# Patient Record
Sex: MALE | Race: WHITE | NOT HISPANIC OR LATINO | Employment: FULL TIME | URBAN - METROPOLITAN AREA
[De-identification: names, ages, dates, MRNs, and addresses within clinical notes are randomized per-mention and may not be internally consistent; named-entity substitution may affect disease eponyms.]

---

## 2021-04-20 ENCOUNTER — OFFICE VISIT - HEALTHEAST (OUTPATIENT)
Dept: FAMILY MEDICINE | Facility: CLINIC | Age: 41
End: 2021-04-20

## 2021-04-20 DIAGNOSIS — G89.29 CHRONIC PAIN OF RIGHT KNEE: ICD-10-CM

## 2021-04-20 DIAGNOSIS — M23.203 OLD TEAR OF MEDIAL MENISCUS OF RIGHT KNEE, UNSPECIFIED TEAR TYPE: ICD-10-CM

## 2021-04-20 DIAGNOSIS — M77.11 LATERAL EPICONDYLITIS OF RIGHT ELBOW: ICD-10-CM

## 2021-04-20 DIAGNOSIS — M25.561 CHRONIC PAIN OF RIGHT KNEE: ICD-10-CM

## 2021-04-20 RX ORDER — FAMOTIDINE 20 MG/1
20 TABLET, FILM COATED ORAL
Status: SHIPPED | COMMUNITY
Start: 2020-08-05

## 2021-05-03 ENCOUNTER — OFFICE VISIT - HEALTHEAST (OUTPATIENT)
Dept: PHYSICAL THERAPY | Facility: REHABILITATION | Age: 41
End: 2021-05-03

## 2021-05-03 DIAGNOSIS — M25.561 CHRONIC PAIN OF RIGHT KNEE: ICD-10-CM

## 2021-05-03 DIAGNOSIS — M23.203 OLD TEAR OF MEDIAL MENISCUS OF RIGHT KNEE, UNSPECIFIED TEAR TYPE: ICD-10-CM

## 2021-05-03 DIAGNOSIS — G89.29 CHRONIC PAIN OF RIGHT KNEE: ICD-10-CM

## 2021-05-03 DIAGNOSIS — M77.11 LATERAL EPICONDYLITIS OF RIGHT ELBOW: ICD-10-CM

## 2021-05-17 ENCOUNTER — OFFICE VISIT - HEALTHEAST (OUTPATIENT)
Dept: PHYSICAL THERAPY | Facility: REHABILITATION | Age: 41
End: 2021-05-17

## 2021-05-17 DIAGNOSIS — M23.203 OLD TEAR OF MEDIAL MENISCUS OF RIGHT KNEE, UNSPECIFIED TEAR TYPE: ICD-10-CM

## 2021-05-17 DIAGNOSIS — M25.561 CHRONIC PAIN OF RIGHT KNEE: ICD-10-CM

## 2021-05-17 DIAGNOSIS — G89.29 CHRONIC PAIN OF RIGHT KNEE: ICD-10-CM

## 2021-05-17 DIAGNOSIS — M77.11 LATERAL EPICONDYLITIS OF RIGHT ELBOW: ICD-10-CM

## 2021-06-05 VITALS
DIASTOLIC BLOOD PRESSURE: 90 MMHG | OXYGEN SATURATION: 99 % | SYSTOLIC BLOOD PRESSURE: 120 MMHG | WEIGHT: 183.8 LBS | HEART RATE: 70 BPM

## 2021-06-16 NOTE — PROGRESS NOTES
Assessment and Plan   41-year-old male with past medical history of meniscal injury approximately 10 years ago who presents with concerns of acute on chronic right knee pain as well as new right elbow pain.  History and exam most consistent with medial meniscal injury of the right knee and lateral epicondylitis.  Recommended conservative treatment at this time with physical therapy for both, course of scheduled NSAIDs for the next week for lateral epicondylitis, given exercises to do for both in the meantime until physical therapy starts, icing daily, wearing brace for tennis elbow.  Did discuss trying corticosteroid injections in his knee.  Shared decision making decided to wait on this for now but in the future if no improvement in symptoms would consider this.  Would also consider referral to orthopedics as well as further imaging if no improvement in 6 weeks with these therapies.  Recommended he schedule follow-up with me at that time and will do physical exam as well as he wishes to establish care with myself and have a physical.    1. Chronic pain of right knee  - Ambulatory referral to Adult PT- Internal    2. Old tear of medial meniscus of right knee, unspecified tear type  - Ambulatory referral to Adult PT- Internal    3. Lateral epicondylitis of right elbow  - Ambulatory referral to Adult PT- Internal    Follow up: 4-6 weeks for physical  Options for treatment and follow-up care were reviewed with the patient and/or guardian. Ted LUIS Jeffry and/or guardian engaged in the decision making process and verbalized understanding of the options discussed and agreed with the final plan.    Dr. Brian Gonzalez MD         HPI:   Ted M Teran is a 41 y.o.  male with problems as below, who presents for:    Chief Complaint   Patient presents with     Knee Pain     knee pain. 2 months ago pain flared up from injury 10 years ago. tear in miniscus from mri      Elbow Injury     right elbow, slipped on driveway a  month ago. wrist hurt and now elbow, pain keeps him up at night, does massage and chiropractor for elbow      Right knee pain:  Patient has had pain in the right knee for last 10 years. He had an MRI in [place] which apparently showed a meniscal tear. Very small per patient.  No injury then.  He describes th pain as the inside of his knee and is aggravated by kneeling or crouching.  Mild swelling at end of th day of th inside. Never has done physical therapy. Has not tried any medications.     Elbow:  4-5 weeks ago slipped on ice and hit elbow on ground.  Having waxing and waning pain since.  He went a chirpractor which helped some. He has been wearing a brace for tennis elbow which helps at work but then when he gets home he has persistent pain.  Descries the pain as on the lateral aspect of his elbow.  He thinks extending his elbow agravates it. Also aggravated by picking things up.            PMHX:   There is no problem list on file for this patient.      Current Outpatient Medications   Medication Sig Dispense Refill     famotidine (PEPCID) 20 MG tablet Take 20 mg by mouth.       No current facility-administered medications for this visit.        Social History     Tobacco Use     Smoking status: Not on file   Substance Use Topics     Alcohol use: Not on file     Drug use: Not on file       Social History     Social History Narrative     Not on file       No Known Allergies           Review of Systems:    Complete ROS is negative except as noted in the HPI         Physical Exam:   /90 (Patient Site: Right Arm, Patient Position: Sitting, Cuff Size: Adult Large)   Pulse 70   Wt 183 lb 12.8 oz (83.4 kg)   SpO2 99%     General appearance: Alert, cooperative, no distress, appears stated age  Head: Normocephalic, atraumatic, without obvious abnormality  Eyes: Pupils equal round, reactive.  Conjunctiva clear.  Neck: Supple, symmetric, trachea midline, no adenopathy. .  Lungs: Clear to auscultation  bilaterally, no wheezing or crackles present.  Respirations unlabored  Heart: Regular rate and rhythm, normal S1 and S2, no murmur, rub or gallop.    Right Knee:  ROM: 0-130; Crepitus: no  Effusion: no ; Swelling: no  Strength: Full in flexion/ extension  Tenderness: Patella - no Medial joint line - no; Lateral joint line - no; Quad tendon - no; Patellar tendon- no; Hamstring - no.  Cruciates: anterior drawer - neg/posterior drawer -neg. Lachman - neg  Collaterals: varus -neg/valgus -mildly painful  Patella: patellar compression - neg  Meniscus: Nasreen - neg    Right ELBOW:   Swelling: no   ROM: Flexion - Full/ extension - Full/ pronation - Full / supination- Full.   Strength: 5/5 w/ elbow flexion/ extension   Bony tenderness: No tenderness at medial epicondyle.  MIld pain at lateral epicondyle with palpation  Neuro: Negative ulnar tinel test.   Maneuvers: No pain w/ resisted wrist flexion/ pronation ; pain w/ resisted wrist extension.  None with supination/ long finger extension;

## 2021-06-16 NOTE — PATIENT INSTRUCTIONS - HE
Do excercises 20 min every day  GO physical therapy  Use ibuprofen 2 tablets twice daily for the next week  Icing on knee and elbow  Wear elbow brace

## 2021-06-17 NOTE — PROGRESS NOTES
[facility]   Knee Initial Evaluation    Patient Name: Ted Teran  Date of evaluation: 5/3/47742  Referral Diagnosis: Chronic pain of right knee  Referring provider: Brian Nicholson, *  Visit Diagnosis:     ICD-10-CM    1. Chronic pain of right knee  M25.561     G89.29    2. Old tear of medial meniscus of right knee, unspecified tear type  M23.203    3. Lateral epicondylitis of right elbow  M77.11         Precautions / Restrictions : none       Assessment:      Impairments in  pain, posture, ROM, joint mobility, strength, gait/locomotion and balance  Patient's signs and symptoms are consistent with right lateral epicondylitis, and possible remote medial horn anterolateral meniscal tear.  Patient responded well to manual therapy and HEP.  Prognosis to achieve goals is  good   Pt. is appropriate for skilled PT intervention as outlined in the Plan of Care (POC).    Goals:  Pt. will demonstrate/verbalize independence in self-management of condition in : 6 weeks  Pt. will be independent with home exercise program in : 6 weeks  Pt. will report decreased intensity, frequency of : Pain;in 6 weeks  Pt. will have improved quality of sleep: with less pain;waking less times/night;in 6 weeks  Patient will work: with improved ability to work without restrictions;in 6 weeks  Patient will reach / maintain arm movement: overhead;forward;behind;for work;for home chores;for dressing;with no pain;with less difficulty;in 6 weeks    No data recorded    Patient's expectations/goals are realistic.    Barriers to Learning or Achieving Goals:  No Barriers.       Plan / Patient Instructions:      Plan of Care:   Communication with: Referral Source  Patient Related Instruction: Nature of Condition;Body mechanics;Posture;Treatment plan and rationale;Next steps;Precautions;Self Care instruction;Expected outcome;Basis of treatment  Times per Week: 1  Number of Weeks: 6-8  Number of Visits: 8  Discharge Planning:  to include HEP and self management.  Precautions / Restrictions : none  Therapeutic Exercise: ROM;Stretching;Strengthening  Neuromuscular Reeducation: posture;core  Manual Therapy: myofascial release;joint mobilization;soft tissue mobilization;muscle energy      Plan for next visit: review HEP, continue manual therapy, trial eccentric strengthening.     Subjective:          History of Present Illness:    Ted is a 41 y.o. male who presents to therapy today with complaints of right elbow pain that is constant and increases with activity.  He also complains of intermittent right knee pain, which he thinks comes from an old meniscal tear injury that was diagnosed by his ortho MD with MRI. Date of onset/duration of symptoms is March 2021. Onset was sudden and related to trauma. Symptoms are constant, intermittent and not improving. He denies history of similar symptoms. He describes their previous level of function as not limited    Pain Rating:3  Pain rating at best: 3  Pain rating at worst: 10  Pain description:aching, dull, pain, sharp and soreness    Functional limitations are described as occurring with:   gripping and holding  lifting  reaching overhead  sleeping         Objective:      Note: Items left blank indicates the item was not performed or not indicated at the time of the evaluation.    Patient Outcome Measures :    No data recorded   Scores range from 0-80, where a score of 80 represents maximum function. The minimal clinically important difference is a positive change of 9 points.    Knee Examination  1. Chronic pain of right knee     2. Old tear of medial meniscus of right knee, unspecified tear type     3. Lateral epicondylitis of right elbow       Precautions / Restrictions : none     Involved Side: Right  Posture Observation:      General sitting posture is  fair.  General standing posture is fair.  Cervical:  Moderate forward head  Shoulder/Thoracic complex: Moderate bilateral scapular  protraction   Assistive Device: None  Gait Observation: WNL  Lumbar Clearing: Does not provoke symptoms  Hip Clearing: Does not provoke symptoms    Knee ROM       Date:  05/03/21     AROM in degrees  Right   Left  Right   Left  Right   Left       Knee Flexion  (130 )   130                      Knee Extension  (0 )   0                  130  PROM in degrees  Right   Left  Right   Left  Right   Left       Knee Flexion  (130 )                         Knee Extension  (0 )                       LE Strength                             Date:  05/03/21    Strength (MMT/5)  Right   Left  Right   Left  Right   Left       Hip Flexion   5 5                     Hip Abduction   4+ 5                     Hip Adduction   4 5                     Hip Extension                         Hip Internal Rotation                         Hip External Rotation                         Knee Extension   5 5                     Knee Flexion   5 5                   Ankle Dorsiflexion   5 5                     Ankle Plantarflexion                       Elbow / Wrist Special Tests  Elbow Right (+/-) Left (+/-) Wrist Right (+/-) Left (+/-)   Elbow extension   Axial load of thumb     Valgus stress   Scaphoid shift test     Varus stress   Finkelstein s test     Moving valgus stress   Carpal compression     Tinel s test   Tinel s test     Ulnar nerve compression   Phalen s test     Lateral epicondylalgia cluster.  Pain with: +  Clinical prediction rule for CTS:       Palpation of Lat. Epi. +    Age >45       Resisted wrist extension +    Shaking hands relieves symptoms       Resisted middle finger extension +    Wrist Ratio Index >0.67     Other:     Reduced Sensory Field First Digit     Other:   TFCC Lift Test     Other   TFCC Load Test       Elbow / Wrist Strength:  Date:      Elbow/Wrist Strength (/5)  Manual Muscle Test (MMT) MMT MMT MMT    Right Left Right Left Right Left   Elbow Flexion  5 5       Elbow Extension  4        Forearm Supination        "  Forearm Pronation         Wrist Flexion         Wrist Extension         Wrist Radial Deviation         Wrist Ulnar Deviation                 Flexibility & Palpation:  Tender right knee joint line, restricted quads, hamstrings, restriction right biceps, triceps, wrist extensors    Knee Special Tests (+/-):       Knee OA Cluster   Right   Left   Ligament Tests   Right   Left    1. > 49 y/o         Lachman   -       2. Stiffness > 30 min.         Anterior Drawer          3. Crepitus         Posterior Drawer          4. Bony tenderness         Posterior Sag          5. Bone enlargement         Valgus Stress   -       6. No warmth to the touch           Varus Stress   -        Meniscal Tests   Right   Left    Other   Right    Left       Nasreen's           Ely's             Joint line tenderness   +        Stan             Thessaly Thomas Apley's                        Treatment Today     Therapeutic Exercises:  Exercise #1: right wrist extensor stretch  Comment #1: 30\" x 2  Exercise #2: right wrist flexor stretch  Comment #2: 30\" x 2  Exercise #3: standing hamstring stretch  Comment #3: 30\" x 2 bilateral  Exercise #4: ulnar nerve butterlfly  Comment #4: 20  Exercise #5: radial nerve tensioner  Comment #5: 10 right.         Manual therapy:  MFR right layer 1-3: upper trap, pec major, pec minor, infraspinatus, pec major, biceps, triceps, dorsal and ventral forearm.    Right elbow distraction in 90 degrees of flexion.    TREATMENT MINUTES COMMENTS   Evaluation 25    Self-care/ Home management     Manual therapy 20    Neuromuscular Re-education     Therapeutic Activity     Therapeutic Exercises 10    Gait training     Modality__________________                Total 55    Blank areas are intentional and mean the treatment did not include these items.              Anastacio Tran, PT  5/3/2021  4:16 PM                "

## 2021-06-17 NOTE — PROGRESS NOTES
Progress Notes by Anastacio Tran PT at 5/17/2021  4:15 PM     Author: Anastacio Tran PT Service: -- Author Type: Physical Therapist    Filed: 8/10/2021  6:17 PM Encounter Date: 5/17/2021 Status: Addendum    : Anastacio Tran PT (Physical Therapist)    Related Notes: Original Note by Anastacio Tran PT (Physical Therapist) filed at 5/17/2021  4:46 PM       [facility] Discharge Summary  Patient Name: Ted Teran  Date: 8/10/2021  Referral Diagnosis:    Referring provider: Brian Nicholson, *  Visit Diagnosis:   1. Chronic pain of right knee     2. Old tear of medial meniscus of right knee, unspecified tear type     3. Lateral epicondylitis of right elbow         Goals:  Pt. will demonstrate/verbalize independence in self-management of condition in : 6 weeks;Progressing toward  Pt. will be independent with home exercise program in : 6 weeks;Met  Pt. will report decreased intensity, frequency of : Pain;in 6 weeks;Met  Pt. will have improved quality of sleep: with less pain;waking less times/night;in 6 weeks;Progressing toward  Patient will work: with improved ability to work without restrictions;in 6 weeks;Progressing toward  Patient will reach / maintain arm movement: forward;overhead;behind;for work;for home chores;for dressing;with no pain;with less difficulty;in 6 weeks;Progressing toward    No data recorded    Patient was seen for 2 visits ending on 5/17/21.  A trial of independent self management was initiated.  The patient did not return to continue any physical therapy.  Please see attached note for patient status.    Therapy will be discontinued at this time.     Thank you for your referral.  Anastacio Tran  8/10/2021  6:17 PM       [facility] Daily Progress     Patient Name: Ted Teran  Date: 5/17/2021  Visit #: 2/8  evaluation dates:  5/3/21  Referral Diagnosis:    Referring provider: Brian Nicholson,  *  Visit Diagnosis:     ICD-10-CM    1. Chronic pain of right knee  M25.561     G89.29    2. Old tear of medial meniscus of right knee, unspecified tear type  M23.203    3. Lateral epicondylitis of right elbow  M77.11        Precautions / Restrictions : none       Assessment:     Response to Intervention:  Good tissue changes with manual therapy.  Good overall progress.    Symptoms are consistent with:  Medical diagnosis.  Patient is appropriate to continue with skilled physical therapy intervention, as indicated by initial plan of care.    Goal Status:  Pt. will demonstrate/verbalize independence in self-management of condition in : 6 weeks;Progressing toward  Pt. will be independent with home exercise program in : 6 weeks;Met  Pt. will report decreased intensity, frequency of : Pain;in 6 weeks;Met  Pt. will have improved quality of sleep: with less pain;waking less times/night;in 6 weeks;Progressing toward  Patient will work: with improved ability to work without restrictions;in 6 weeks;Progressing toward  Patient will reach / maintain arm movement: forward;overhead;behind;for work;for home chores;for dressing;with no pain;with less difficulty;in 6 weeks;Progressing toward    No data recorded  Other functional progress:           Plan / Patient Education:       Trial of independent self-management of condition initiated.  Patient to contact PT by phone or schedule an appointment as needed if symptoms increase or progress stops.  If patient has not returned to continue therapy in 30 days then physical therapy will be discharged.     Subjective:     Pain Rating:  Resting 0  Activity:  5    Response to last treatment: felt good.  HEP- Frequency: 2x/day, Questions or difficulties:  none.    Patient reports:      Elbow getting better since 1st visit.    Not as painful.    Pain is more intermittent.    He has stopped using the brace.    Exercises going well.    60-70% overall improvement since starting PT    Knee is  "feeling better.  Painful kneeling or deep squat.      Objective:              Treatment Today   Manual Therapy  MFR right layer 1-3: scalenes, Levator, upper trap, pec major, pec minor, infraspinatus, pec major, biceps, triceps, dorsal and ventral forearm.    Exercises:  Exercise #1: right wrist extensor stretch  Comment #1: 30\" x 2  Exercise #2: right wrist flexor stretch  Comment #2: 30\" x 2  Exercise #3: standing hamstring stretch  Comment #3: 30\" x 2 bilateral  Exercise #4: ulnar nerve butterlfly  Comment #4: 20  Exercise #5: radial nerve tensioner  Comment #5: 10 right.            TREATMENT MINUTES COMMENTS   Evaluation     Self-care/ Home management     Manual therapy 25 See above.   Neuromuscular Re-education     Therapeutic Activity     Therapeutic Exercises   See exercise flow-sheet for details.    Gait training     Modality__________________                Total 25    Blank areas are intentional and mean the treatment did not include these items.       Anastacio Tran, PT  5/17/2021    [facility]        "

## 2021-08-22 ENCOUNTER — HEALTH MAINTENANCE LETTER (OUTPATIENT)
Age: 41
End: 2021-08-22

## 2021-08-31 ENCOUNTER — OFFICE VISIT (OUTPATIENT)
Dept: FAMILY MEDICINE | Facility: CLINIC | Age: 41
End: 2021-08-31
Payer: COMMERCIAL

## 2021-08-31 VITALS
HEART RATE: 75 BPM | DIASTOLIC BLOOD PRESSURE: 86 MMHG | OXYGEN SATURATION: 98 % | TEMPERATURE: 97.9 F | SYSTOLIC BLOOD PRESSURE: 126 MMHG | WEIGHT: 186.1 LBS | RESPIRATION RATE: 20 BRPM

## 2021-08-31 DIAGNOSIS — K21.9 GASTROESOPHAGEAL REFLUX DISEASE, UNSPECIFIED WHETHER ESOPHAGITIS PRESENT: ICD-10-CM

## 2021-08-31 DIAGNOSIS — R10.11 ABDOMINAL PAIN, RIGHT UPPER QUADRANT: Primary | ICD-10-CM

## 2021-08-31 LAB
ALBUMIN SERPL-MCNC: 4 G/DL (ref 3.5–5)
ALP SERPL-CCNC: 31 U/L (ref 45–120)
ALT SERPL W P-5'-P-CCNC: 22 U/L (ref 0–45)
ANION GAP SERPL CALCULATED.3IONS-SCNC: 10 MMOL/L (ref 5–18)
AST SERPL W P-5'-P-CCNC: 21 U/L (ref 0–40)
BILIRUB SERPL-MCNC: 0.7 MG/DL (ref 0–1)
BUN SERPL-MCNC: 22 MG/DL (ref 8–22)
CALCIUM SERPL-MCNC: 9.4 MG/DL (ref 8.5–10.5)
CHLORIDE BLD-SCNC: 105 MMOL/L (ref 98–107)
CO2 SERPL-SCNC: 25 MMOL/L (ref 22–31)
CREAT SERPL-MCNC: 0.94 MG/DL (ref 0.7–1.3)
ERYTHROCYTE [DISTWIDTH] IN BLOOD BY AUTOMATED COUNT: 11.8 % (ref 10–15)
GFR SERPL CREATININE-BSD FRML MDRD: >90 ML/MIN/1.73M2
GLUCOSE BLD-MCNC: 91 MG/DL (ref 70–125)
HCT VFR BLD AUTO: 41.8 % (ref 40–53)
HGB BLD-MCNC: 14.9 G/DL (ref 13.3–17.7)
LIPASE SERPL-CCNC: 51 U/L (ref 0–52)
MCH RBC QN AUTO: 30.7 PG (ref 26.5–33)
MCHC RBC AUTO-ENTMCNC: 35.6 G/DL (ref 31.5–36.5)
MCV RBC AUTO: 86 FL (ref 78–100)
PLATELET # BLD AUTO: 217 10E3/UL (ref 150–450)
POTASSIUM BLD-SCNC: 4 MMOL/L (ref 3.5–5)
PROT SERPL-MCNC: 7.4 G/DL (ref 6–8)
RBC # BLD AUTO: 4.86 10E6/UL (ref 4.4–5.9)
SODIUM SERPL-SCNC: 140 MMOL/L (ref 136–145)
WBC # BLD AUTO: 7 10E3/UL (ref 4–11)

## 2021-08-31 PROCEDURE — 36415 COLL VENOUS BLD VENIPUNCTURE: CPT | Performed by: NURSE PRACTITIONER

## 2021-08-31 PROCEDURE — 85027 COMPLETE CBC AUTOMATED: CPT | Performed by: NURSE PRACTITIONER

## 2021-08-31 PROCEDURE — 99214 OFFICE O/P EST MOD 30 MIN: CPT | Performed by: NURSE PRACTITIONER

## 2021-08-31 PROCEDURE — 83690 ASSAY OF LIPASE: CPT | Performed by: NURSE PRACTITIONER

## 2021-08-31 PROCEDURE — 80053 COMPREHEN METABOLIC PANEL: CPT | Performed by: NURSE PRACTITIONER

## 2021-08-31 NOTE — PROGRESS NOTES
Assessment and Plan:     Abdominal pain, right upper quadrant  Gastroesophageal reflux disease, unspecified whether esophagitis present  We will check hemogram, CMP, lipase.  Differentials include cholelithiasis, gastritis, gastric ulcer, GERD, pancreatitis.  Will obtain abdominal ultrasound for further evaluation.  Discussed foods to avoid and remain upright for 2 hours after eating.  If no improvement in symptoms, he is to follow-up with his PCP.  He is content with the plan.  - CBC with platelets  - Comprehensive metabolic panel (BMP + Alb, Alk Phos, ALT, AST, Total. Bili, TP)  - Lipase  - US Abdomen Limited      Subjective:     Ted is a 41 year old male presenting to the clinic with his wife for concerns of abdominal pain.  Patient has experienced heartburn symptoms for 3 years.  He recently stopped taking Pepcid and has tried adjusting his diet.  1 week after stopping Pepcid, he developed right upper quadrant abdominal pain which he described as a dull ache.  Pain was severe enough that he had difficulty finding a comfortable spot while sitting or laying down.  His wife states he was whimpering due to the pain.  Patient experienced dyspepsia.  He slept for 2 hours and his symptoms resolved.  He did not vomit or have constipation or diarrhea.  He has not had any blood or mucus in the stool.  Patient had similar symptoms when he was a teenager and saw functional medicine chiropractor who recommended a vitamin supplement.  The chiropractor also told him he likely had gallbladder abnormalities.      Reviewof Systems: A complete 14 point review of systems was obtained and is negative or as stated in the history of present illness.    Social History     Socioeconomic History     Marital status:      Spouse name: Not on file     Number of children: Not on file     Years of education: Not on file     Highest education level: Not on file   Occupational History     Not on file   Tobacco Use     Smoking  status: Never Smoker   Substance and Sexual Activity     Alcohol use: Not on file     Drug use: Not on file     Sexual activity: Not on file   Other Topics Concern     Not on file   Social History Narrative     Not on file     Social Determinants of Health     Financial Resource Strain:      Difficulty of Paying Living Expenses:    Food Insecurity:      Worried About Running Out of Food in the Last Year:      Ran Out of Food in the Last Year:    Transportation Needs:      Lack of Transportation (Medical):      Lack of Transportation (Non-Medical):    Physical Activity:      Days of Exercise per Week:      Minutes of Exercise per Session:    Stress:      Feeling of Stress :    Social Connections:      Frequency of Communication with Friends and Family:      Frequency of Social Gatherings with Friends and Family:      Attends Gnosticist Services:      Active Member of Clubs or Organizations:      Attends Club or Organization Meetings:      Marital Status:    Intimate Partner Violence:      Fear of Current or Ex-Partner:      Emotionally Abused:      Physically Abused:      Sexually Abused:        Active Ambulatory Problems     Diagnosis Date Noted     No Active Ambulatory Problems     Resolved Ambulatory Problems     Diagnosis Date Noted     No Resolved Ambulatory Problems     No Additional Past Medical History       No family history on file.    Objective:     /86   Pulse 75   Temp 97.9  F (36.6  C) (Oral)   Resp 20   Wt 84.4 kg (186 lb 1.6 oz)   SpO2 98%     Patient is alert, in no obvious distress.   Skin: Warm, dry.    Lungs:  Clear to auscultation. Respirations even and unlabored.  No wheezing or rales noted.   Heart:  Regular rate and rhythm.  No murmurs, S3, S4, gallops, or rubs.    Abdomen: Soft, tenderness to palpation left mid abdomen.  No organomegaly. Bowel sounds normoactive. No guarding or masses noted.

## 2021-08-31 NOTE — LETTER
September 1, 2021      Ted Teran  [address] [place] [id number]        Dear ,    We are writing to inform you of your test results.    Your lab results show no concerning findings. Your gall bladder enzyme is within the upper limit of normal.  I will notify you once I receive the ultrasound results.      Resulted Orders   CBC with platelets   Result Value Ref Range    WBC Count 7.0 4.0 - 11.0 10e3/uL    RBC Count 4.86 4.40 - 5.90 10e6/uL    Hemoglobin 14.9 13.3 - 17.7 g/dL    Hematocrit 41.8 40.0 - 53.0 %    MCV 86 78 - 100 fL    MCH 30.7 26.5 - 33.0 pg    MCHC 35.6 31.5 - 36.5 g/dL    RDW 11.8 10.0 - 15.0 %    Platelet Count 217 150 - 450 10e3/uL   Comprehensive metabolic panel (BMP + Alb, Alk Phos, ALT, AST, Total. Bili, TP)   Result Value Ref Range    Sodium 140 136 - 145 mmol/L    Potassium 4.0 3.5 - 5.0 mmol/L    Chloride 105 98 - 107 mmol/L    Carbon Dioxide (CO2) 25 22 - 31 mmol/L    Anion Gap 10 5 - 18 mmol/L    Urea Nitrogen 22 8 - 22 mg/dL    Creatinine 0.94 0.70 - 1.30 mg/dL    Calcium 9.4 8.5 - 10.5 mg/dL    Glucose 91 70 - 125 mg/dL    Alkaline Phosphatase 31 (L) 45 - 120 U/L    AST 21 0 - 40 U/L    ALT 22 0 - 45 U/L    Protein Total 7.4 6.0 - 8.0 g/dL    Albumin 4.0 3.5 - 5.0 g/dL    Bilirubin Total 0.7 0.0 - 1.0 mg/dL    GFR Estimate >90 >60 mL/min/1.73m2      Comment:      As of July 11, 2021, eGFR is calculated by the CKD-EPI creatinine equation, without race adjustment. eGFR can be influenced by muscle mass, exercise, and diet. The reported eGFR is an estimation only and is only applicable if the renal function is stable.   Lipase   Result Value Ref Range    Lipase 51 0 - 52 U/L       If you have any questions or concerns, please call the clinic at the number listed above.       Sincerely,      NATALIA Hill CNP

## 2021-09-07 ENCOUNTER — HOSPITAL ENCOUNTER (OUTPATIENT)
Dept: ULTRASOUND IMAGING | Facility: CLINIC | Age: 41
Discharge: HOME | End: 2021-09-07
Attending: NURSE PRACTITIONER | Admitting: NURSE PRACTITIONER
Payer: COMMERCIAL

## 2021-09-07 DIAGNOSIS — K21.9 GASTROESOPHAGEAL REFLUX DISEASE, UNSPECIFIED WHETHER ESOPHAGITIS PRESENT: ICD-10-CM

## 2021-09-07 DIAGNOSIS — R10.11 ABDOMINAL PAIN, RIGHT UPPER QUADRANT: Primary | ICD-10-CM

## 2021-09-07 DIAGNOSIS — R10.11 ABDOMINAL PAIN, RIGHT UPPER QUADRANT: ICD-10-CM

## 2021-09-07 PROCEDURE — 76705 ECHO EXAM OF ABDOMEN: CPT

## 2021-10-17 ENCOUNTER — HEALTH MAINTENANCE LETTER (OUTPATIENT)
Age: 41
End: 2021-10-17

## 2021-11-12 ENCOUNTER — TRANSFERRED RECORDS (OUTPATIENT)
Dept: HEALTH INFORMATION MANAGEMENT | Facility: CLINIC | Age: 41
End: 2021-11-12
Payer: COMMERCIAL

## 2022-10-03 ENCOUNTER — HEALTH MAINTENANCE LETTER (OUTPATIENT)
Age: 42
End: 2022-10-03

## 2023-10-21 ENCOUNTER — HEALTH MAINTENANCE LETTER (OUTPATIENT)
Age: 43
End: 2023-10-21